# Patient Record
Sex: FEMALE | Race: WHITE | HISPANIC OR LATINO | URBAN - METROPOLITAN AREA
[De-identification: names, ages, dates, MRNs, and addresses within clinical notes are randomized per-mention and may not be internally consistent; named-entity substitution may affect disease eponyms.]

---

## 2023-01-23 ENCOUNTER — OFFICE VISIT (OUTPATIENT)
Dept: OBGYN CLINIC | Facility: CLINIC | Age: 38
End: 2023-01-23

## 2023-01-23 VITALS
WEIGHT: 207.2 LBS | HEART RATE: 75 BPM | BODY MASS INDEX: 35.37 KG/M2 | HEIGHT: 64 IN | SYSTOLIC BLOOD PRESSURE: 120 MMHG | DIASTOLIC BLOOD PRESSURE: 79 MMHG

## 2023-01-23 DIAGNOSIS — N92.0 MENORRHAGIA WITH REGULAR CYCLE: ICD-10-CM

## 2023-01-23 DIAGNOSIS — Z01.419 ENCOUNTER FOR ANNUAL ROUTINE GYNECOLOGICAL EXAMINATION: Primary | ICD-10-CM

## 2023-01-23 DIAGNOSIS — Z00.00 ENCOUNTER FOR MEDICAL EXAMINATION TO ESTABLISH CARE: ICD-10-CM

## 2023-01-23 NOTE — LETTER
2023    To Ene Ann  : 1985      This letter is to advise you that your recent PAP SMEAR results were reviewed by me and are NORMAL    We will see you in 1 year for your annual exam     Stacia Luna

## 2023-01-23 NOTE — PATIENT INSTRUCTIONS
Call with needs or concerns  Schedule pelvic ultrasound  Return in 1 year for annual GYN exam  Schedule family Practice appointment    COVID-19 Instructions    If you are having any of the following:  Cough   Shortness of breath   Fever  If traveled within past 2 weeks internationally or to high risk [place] states  Or been in contact with someone that has     Please call either:   Your PCP office  [phone], option [phone]    They will screen you over the phone and direct you to the nearest appropriate testing location    DO NOT go to your PCP or OB office without calling first       Samreen Allred

## 2023-01-23 NOTE — PROGRESS NOTES
Annual Exam    Assessment   1  Encounter for annual routine gynecological examination  Liquid-based pap, screening      2  Encounter for medical examination to establish care  Ambulatory Referral to [facility]      3  Menorrhagia with regular cycle  US pelvis complete non OB        well woman       Plan       All questions answered  Breast self exam technique reviewed and patient encouraged to perform self-exam monthly  Contraception: tubal ligation  Discussed healthy lifestyle modifications  Follow up in 1 year  Thin prep Pap smear  Patient Instructions   Call with needs or concerns  Schedule pelvic ultrasound  Return in 1 year for annual GYN exam  Schedule family Practice appointment  Pt verbalized understanding of all discussed  Subjective      Ene Orona Rater is a 40 y o  No obstetric history on file  female who presents for annual well woman exam  Periods are regular every 28-30 days, lasting 5 days  No intermenstrual bleeding, spotting, or discharge  Last PAP was in the [facility], 2 years ago, results are not available  Pt states she was told in the [facility] she has fibroids, no surgery was recommended, pt states she has heavy painful periods    Explained based on Hx of fibroids and heavy painful periods a pelvic U/S was ordered    Depression Screening Follow-up Plan: Patient's depression screening was negative with a PHQ-2 score of 0  Their PHQ-9 score was 0  Clinically patient does not have depression  No treatment is required  BMI Counseling: Body mass index is 35 57 kg/m²  The BMI is above normal  Nutrition recommendations include decreasing overall calorie intake, moderation in carbohydrate intake and increasing intake of lean protein  Exercise recommendations include moderate aerobic physical activity for 150 minutes/week          Current contraception: tubal ligation  History of abnormal Pap smear: no  Family history of uterine or ovarian cancer: no  Regular self breast exam: yes  History of abnormal mammogram: N/A  Family history of breast cancer: no  History of abnormal lipids: unknown  Menstrual History:  OB History        2    Para   2    Term   2            AB        Living   2       SAB        IAB        Ectopic        Multiple        Live Births   2                Menarche age: 16  Patient's last menstrual period was 2023 (exact date)  The following portions of the patient's history were reviewed and updated as appropriate: allergies, current medications, past family history, past medical history, past social history, past surgical history and problem list     Review of Systems  Pertinent items are noted in HPI        Objective      /79   Pulse 75   Ht 5' 4" (1 626 m)   Wt 94 kg (207 lb 3 2 oz)   LMP 2023 (Exact Date)   BMI 35 57 kg/m²     General: alert and oriented, in no acute distress, alert, appears stated age and cooperative   Heart: regular rate and rhythm, S1, S2 normal, no murmur, click, rub or gallop   Lungs: clear to auscultation bilaterally, WNL respiratory effort, negative cough or SOB   Thyroid: Negative masses   Abdomen: soft, non-tender, without masses or organomegaly   Vulva: normal   Vagina: normal mucosa   Cervix: no bleeding following Pap, no cervical motion tenderness and no lesions   Uterus: anteverted, non-tender, normal shape and consistency   Adnexa: normal adnexa   Urethra: normal   Breasts: NT,negative masses, discharge, or dimpling

## 2023-01-24 LAB
HPV HR 12 DNA CVX QL NAA+PROBE: NEGATIVE
HPV16 DNA CVX QL NAA+PROBE: NEGATIVE
HPV18 DNA CVX QL NAA+PROBE: NEGATIVE

## 2023-01-27 LAB
LAB AP GYN PRIMARY INTERPRETATION: NORMAL
Lab: NORMAL

## 2023-01-28 ENCOUNTER — HOSPITAL ENCOUNTER (OUTPATIENT)
Dept: ULTRASOUND IMAGING | Facility: HOSPITAL | Age: 38
Discharge: HOME | End: 2023-01-28

## 2023-01-28 DIAGNOSIS — N92.0 MENORRHAGIA WITH REGULAR CYCLE: ICD-10-CM

## 2023-02-07 ENCOUNTER — OFFICE VISIT (OUTPATIENT)
Dept: OBGYN CLINIC | Facility: CLINIC | Age: 38
End: 2023-02-07

## 2023-02-07 VITALS
WEIGHT: 206 LBS | BODY MASS INDEX: 35.17 KG/M2 | SYSTOLIC BLOOD PRESSURE: 101 MMHG | HEIGHT: 64 IN | DIASTOLIC BLOOD PRESSURE: 53 MMHG | HEART RATE: 59 BPM

## 2023-02-07 DIAGNOSIS — R10.2 PELVIC PAIN: Primary | ICD-10-CM

## 2023-02-07 NOTE — PROGRESS NOTES
Follow-Up U/S Visit     SUBJECTIVE:  CC: F/u U/S Results  HPI: Ene Vera is a 40 y o   female who presents to discuss U/S results and next steps  The patient has a long-standing history of pelvic pain, constantly present and worse with menses  She still has regular menses, occurring monthly and lasting about 5 days  She describes her pain as "pulling" and pressure  Her pain is very crampy and sharp with her menses  She has been told previously that she has had fibroids  She is s/p tubal sterilization and does not desire more children  She reports normal urinary function and generally normal bowel function with occasional constipation  Discussed the results of her TVUS performed 23, highlighting that (1) her uterus is retroflexed, which is a normal anatomic variant, (2) she has a small ovarian cyst that is normal in a menstruating, ovulating person, and (3) she has multiple fibroids, the largest of which is 8cm  "The uterus is retroflexed in position, measuring 14 2 x 6 8 x 8 8 cm  Diffuse heterogeneous echotexture of the markedly enlarged uterus containing multiple heterogeneous hypoechoic mass lesions noted likely representing leiomyomata  The largest mass measures 8 0 x 8 0 x 6 7 cm  The cervix appears within normal limits "    Treatment options for her pelvic pain were discussed, including expectant management, hormonal (both combination and progesterone-only), Mirena, Lysteda, endometrial ablation, and hysterectomy  The risks, benefits and side effects of each method were discussed, highlighting that hormonal treatment may shrink fibroids but they will not disappear and an ablation may be technically difficult with her fibroids  All questions have been answered to her satisfaction  The patient does not want to try hormonal therapy, as she does not want hormones and she feels they will just delay the correct therapy   She elects to move forward with hysterectomy  Discussed options for hysterectomy, including open, laparoscopic, robotic-assisted laparoscopic, and vaginal  Discussed approach and incisions as well as general lengths of recovery  She has a h/o two prior C/S  Likely plan for laparoscopic or robotically-assisted laparoscopic  Discussed with the patient that we josue schedule a surgical H&P to assess possibility of vaginal hysterectomy, where no incisions are made on the abdomen  She is aware that this is unlikely with her h/o CS x2  Discussed also the need for EMB to ensure benign pathology prior to plan for surgery  Discussed that, if abnormal cells are found, we may need to consult Gyn-Oncology and plan for a different surgery  Recommended she take 800mg of ibuprofen prior to her appointment  Discussed also that any surgery would spare her ovaries; that is, leave her ovaries in place  We plan to do this so that we do not put her into surgical menopause, and so we can protect her heart and bones moving forward  She is agreeable with this plan  History reviewed  No pertinent past medical history  Past Surgical History:   Procedure Laterality Date   •  SECTION      2   • TUBAL LIGATION         Social History     Tobacco Use   • Smoking status: Never   • Smokeless tobacco: Never   Substance Use Topics   • Alcohol use: Not Currently   • Drug use: Never       No current outpatient medications on file  OBJECTIVE:  Vitals:    23 0852   BP: 101/53   Pulse: 59   Weight: 93 4 kg (206 lb)   Height: 5' 4" (1 626 m)           ASSESSMENT/PLAN:  Problem List    None  Visit Diagnoses     Pelvic pain    -  Primary          Ene Sierra is a 40 y o   female who presents to discuss her TVUS and discuss next steps  Plan for Surgical H&P, EMB  Need to sign surgical H&P and MA-30  RTC in 2-3 for appointment        Elma Mclean MD   PGY-3, OBGYN  23 9:33 AM

## 2023-02-15 ENCOUNTER — OFFICE VISIT (OUTPATIENT)
Dept: FAMILY MEDICINE CLINIC | Facility: CLINIC | Age: 38
End: 2023-02-15

## 2023-02-15 VITALS
TEMPERATURE: 97.7 F | SYSTOLIC BLOOD PRESSURE: 110 MMHG | HEART RATE: 90 BPM | OXYGEN SATURATION: 98 % | HEIGHT: 64 IN | RESPIRATION RATE: 16 BRPM | DIASTOLIC BLOOD PRESSURE: 72 MMHG | BODY MASS INDEX: 35.85 KG/M2 | WEIGHT: 210 LBS

## 2023-02-15 DIAGNOSIS — R20.2 PARESTHESIA: ICD-10-CM

## 2023-02-15 DIAGNOSIS — R09.81 NASAL CONGESTION: ICD-10-CM

## 2023-02-15 DIAGNOSIS — Z11.4 SCREENING FOR HIV (HUMAN IMMUNODEFICIENCY VIRUS): ICD-10-CM

## 2023-02-15 DIAGNOSIS — M25.50 POLYARTHRALGIA: ICD-10-CM

## 2023-02-15 DIAGNOSIS — R20.9 COLD HANDS: ICD-10-CM

## 2023-02-15 DIAGNOSIS — E66.9 OBESITY (BMI 30-39.9): ICD-10-CM

## 2023-02-15 DIAGNOSIS — R42 DIZZINESS: Primary | ICD-10-CM

## 2023-02-15 DIAGNOSIS — Z11.59 ENCOUNTER FOR HEPATITIS C SCREENING TEST FOR LOW RISK PATIENT: ICD-10-CM

## 2023-02-15 DIAGNOSIS — N92.6 IRREGULAR MENSES: ICD-10-CM

## 2023-02-15 NOTE — PATIENT INSTRUCTIONS
Mareos   CUIDADO AMBULATORIO:   Mareos es la sensación de falta de equilibrio o inestabilidad  Las causas comunes del mareo son un desequilibrio del líquido del oído interno o la falta de oxígeno en guerin joni  Los [employer] ser [employer] (durar 3 días o menos) o crónicos (durar más de 3 días)  Usted puede llegar a tener episodios de [employer] que deleon de segundos a unas horas  Síntomas comunes que pueden presentarse con el mareo:  Salvador sensación de que guerin entorno se está moviendo aun cuando usted está quieto    [employer] oídos o pérdida del oído    Sensación de mareo o desvanecimiento    Debilidad o inestabilidad    Visión doble o movimientos oculares que usted no puede controlar    Náuseas o vómitos    Confusión    Busque atención médica de inmediato si:  Usted tiene dolor de [place] y rigidez en el khadijah  Usted tiene escalofríos con temblores y fiebre  Usted vomita salvador y [place] vez sin alivio  Guerin vómito o deposiciones están barahona o negras  Usted tiene [place] [employer], espalda o abdomen  Usted tiene adormecimiento, sobre todo en la avelina, brazos o piernas  Usted tiene dificultad para  los brazos o las piernas  Usted está confundido  Comuníquese con guerin médico si:  Tiene fiebre  Ramu síntomas no mejoran con el tratamiento  Usted tiene preguntas o inquietudes acerca de guerin condición o cuidado  El tratamiento para los mareos depende de la causa  Guerin médico podría darle oxígeno o medicamentos para disminuir los [employer] y la náusea  Además podría referirlo a un especialista  También es probable que usted necesite que lo internen en el hospital para recibir tratamiento  El manejo de ramu síntomas:  No maneje u opere maquinaria pesada cuando esté mareado  Lev[place]nt[place] lentamente cuando esté sentado o acostado  [place] suficiente líquidos  Los líquidos ayudan a evitar la deshidratación   Pregunte cuánto líquido debe christin cada día y cuáles líquidos son los más adecuados para zehra Love a la consulta de control con hinson médico según las indicaciones: Anote ramu preguntas para que se acuerde de hacerlas corwin ramu visitas  © Copyright [employer] 2022 Information is for End User's use only and may not be sold, redistributed or otherwise used for commercial purposes  All illustrations and images included in CareNotes® are the copyrighted property of A D A M , [facility]  or [address] [place] [address] es sólo para uso en educación  Hinson intención no es darle un consejo médico sobre enfermedades o tratamientos  Colsulte con hinson [employer] farmacéutico antes de seguir cualquier régimen médico para saber si es seguro y efectivo para zehra

## 2023-02-15 NOTE — PROGRESS NOTES
Name: Eunice Umana      : 1985      MRN: [id number]  Encounter Provider: ANTONIO Raymundo  Encounter Date: 2/15/2023   Encounter department: [facility]    Assessment & Plan     1  Dizziness  Assessment & Plan:  She reports daily episodes of feeling as though she will pass out that last a few seconds , occurs generally with position changes   EKG in office showed sinus arrhyhtmia, no st-t inversion/ abnormality   Discussed sx may be 2/2 hypovolemia- will check labs   If any change in sx she is to inform me or present to ED for worsening sx     Orders:  -     CBC and differential; Future  -     Comprehensive metabolic panel; Future  -     Hemoglobin A1C; Future  -     TSH, 3rd generation with Free T4 reflex; Future  -     Iron Panel (Includes Ferritin, Iron Sat%, Iron, and TIBC); Future    2  Polyarthralgia  Assessment & Plan:  Patient reports chronic pain to bilateral shoulders, arms, wrists- no abnormality on exam  Will check labs     Orders:  -     Vitamin D 25 hydroxy; Future    3  Cold hands  -     Vitamin B12; Future    4  Irregular menses  Assessment & Plan:  Patient reports that menses is very painful and irregular - she had pelvic U/S 2023 that showed:  IMPRESSION:  Markedly enlarged heterogeneous uterus containing multiple hypoechoic mass lesions with the largest measuring up to 8 0 cm likely representing leiomyomata  2 1 cm right ovarian cyst/follicle  No suspicious solid adnexal mass or significant free pelvic   fluid noted  A small amount of endometrial fluid noted in the lower uterine segment  Will also check labs   Recommend she continue to follow with gynecology     Orders:  -     CBC and differential; Future  -     Comprehensive metabolic panel; Future  -     Hemoglobin A1C; Future  -     TSH, 3rd generation with Free T4 reflex; Future  -     Iron Panel (Includes Ferritin, Iron Sat%, Iron, and TIBC); Future    5   Obesity (BMI 30-39  9)  Assessment & Plan:  -Encouraged diet and lifestyle changes: decrease processed foods (cakes, cookies, chips, soda), decrease total carbohydrate intake, decrease fried/fatty foods, increase fruits and vegetables, increase lean proteins (chicken, turkey), increase healthy fats (avocado, fish, nuts), drink plenty of water (at least four 16 oz bottles per day)  -encouraged increased activity     Orders:  -     Comprehensive metabolic panel; Future  -     Hemoglobin A1C; Future  -     Lipid panel; Future  -     TSH, 3rd generation with Free T4 reflex; Future    6  Encounter for hepatitis C screening test for low risk patient  -     Hepatitis C antibody; Future    7  Screening for HIV (human immunodeficiency virus)  -     : HIV 1/2 AB/AG w Reflex [facility] for 2 yr old and above; Future    8  Paresthesia  Assessment & Plan:  Reports feeling intermittent "cold" in her hands and numbness and tingling   No abnormalities on exam- +pulse, cap refill,  Sensation   Will check labs     Orders:  -     Vitamin B12; Future    9  Nasal congestion  -     azelastine (ASTELIN) 0 1 % nasal spray; 1 spray into each nostril 2 (two) times a day Use in each nostril as directed         Subjective     41 YO female with no significant past medical hx presents today to establish care  She reports that she is experiencing daily episodes of dizziness for the past several months, worse when she wakes up in the middle of the night when she gets up and lasts a few seconds then resolves  Reports that episodes occur with headache  Denies any cardiac hx or family cardiac hx  No chest pain, shortness of breath, or n/v  She at times experiences a cold feeling of her hands, does not occur at same time as dizziness  She also has pain to her shoulder, wrists, and hands  She does have hx of fibroids and irregular periods, sometimes very heavy bleeding  Review of Systems   Constitutional: Negative for chills and fever     HENT: Negative for ear pain and sore throat  Eyes: Negative for pain and visual disturbance  Respiratory: Negative for cough and shortness of breath  Cardiovascular: Negative for chest pain and palpitations  Gastrointestinal: Negative for abdominal pain and vomiting  Genitourinary: Negative for dysuria and hematuria  Musculoskeletal: Positive for arthralgias and myalgias  Negative for back pain  Skin: Negative for color change and rash  Neurological: Positive for dizziness, numbness and headaches  Negative for seizures and syncope  All other systems reviewed and are negative  No past medical history on file  Past Surgical History:   Procedure Laterality Date   •  SECTION      2   • TUBAL LIGATION       Family History   Problem Relation Age of Onset   • Hypertension Mother    • Diabetes Brother      Social History     Socioeconomic History   • Marital status: /Civil Union     Spouse name: None   • Number of children: None   • Years of education: None   • Highest education level: None   Occupational History   • None   Tobacco Use   • Smoking status: Never   • Smokeless tobacco: Never   Substance and Sexual Activity   • Alcohol use: Not Currently   • Drug use: Never   • Sexual activity: Yes   Other Topics Concern   • None   Social History Narrative   • None     Social Determinants of Health     Financial Resource Strain: Low Risk    • Difficulty of Paying Living Expenses: Not hard at all   Food Insecurity: No Food Insecurity   • Worried About Running Out of Food in the Last Year: Never true   • Ran Out of Food in the Last Year: Never true   Transportation Needs: No Transportation Needs   • Lack of Transportation (Medical): No   • Lack of Transportation (Non-Medical):  No   Physical Activity: Not on file   Stress: Not on file   Social Connections: Not on file   Intimate Partner Violence: Not on file   Housing Stability: Low Risk    • Unable to Pay for Housing in the Last Year: No   • Number of Places Lived in the Last Year: 1   • Unstable Housing in the Last Year: No     No current outpatient medications on file prior to visit  No Known Allergies    There is no immunization history on file for this patient  Objective     /72 (BP Location: Left arm, Patient Position: Sitting, Cuff Size: Adult)   Pulse 90   Temp 97 7 °F (36 5 °C) (Temporal)   Resp 16   Ht 5' 4" (1 626 m)   Wt 95 3 kg (210 lb)   LMP 01/17/2023 (Exact Date)   SpO2 98%   BMI 36 05 kg/m²     Physical Exam  Vitals and nursing note reviewed  Constitutional:       General: She is not in acute distress  Appearance: She is well-developed  She is not diaphoretic  HENT:      Head: Normocephalic and atraumatic  Right Ear: External ear normal       Left Ear: External ear normal       Nose:      Right Turbinates: Enlarged and swollen  Left Turbinates: Enlarged and swollen  Eyes:      General:         Right eye: No discharge  Left eye: No discharge  Conjunctiva/sclera: Conjunctivae normal       Pupils: Pupils are equal, round, and reactive to light  Cardiovascular:      Rate and Rhythm: Normal rate and regular rhythm  Pulmonary:      Effort: Pulmonary effort is normal  No respiratory distress  Breath sounds: Normal breath sounds  No wheezing  Abdominal:      General: Bowel sounds are normal  There is no distension  Palpations: Abdomen is soft  Tenderness: There is no abdominal tenderness  There is no guarding or rebound  Musculoskeletal:         General: No deformity  Normal range of motion  Cervical back: Normal range of motion and neck supple  Lymphadenopathy:      Cervical: No cervical adenopathy  Skin:     General: Skin is warm and dry  Capillary Refill: Capillary refill takes less than 2 seconds  Findings: No rash  Neurological:      General: No focal deficit present  Mental Status: She is alert and oriented to person, place, and time  Cranial Nerves:  No cranial nerve deficit  Sensory: No sensory deficit  Motor: No weakness        Coordination: Coordination normal       Gait: Gait normal       Deep Tendon Reflexes: Reflexes normal    Psychiatric:         Mood and Affect: Mood normal          Behavior: Behavior normal        Rosey ANTONIO Manriquez

## 2023-02-16 ENCOUNTER — APPOINTMENT (OUTPATIENT)
Dept: LAB | Facility: CLINIC | Age: 38
End: 2023-02-16

## 2023-02-16 DIAGNOSIS — R42 DIZZINESS: ICD-10-CM

## 2023-02-16 DIAGNOSIS — N92.6 IRREGULAR MENSES: ICD-10-CM

## 2023-02-16 DIAGNOSIS — Z11.59 ENCOUNTER FOR HEPATITIS C SCREENING TEST FOR LOW RISK PATIENT: ICD-10-CM

## 2023-02-16 DIAGNOSIS — R20.9 COLD HANDS: ICD-10-CM

## 2023-02-16 DIAGNOSIS — M25.50 POLYARTHRALGIA: ICD-10-CM

## 2023-02-16 DIAGNOSIS — Z11.4 SCREENING FOR HIV (HUMAN IMMUNODEFICIENCY VIRUS): ICD-10-CM

## 2023-02-16 DIAGNOSIS — E66.9 OBESITY (BMI 30-39.9): ICD-10-CM

## 2023-02-16 DIAGNOSIS — R20.2 PARESTHESIA: ICD-10-CM

## 2023-02-16 LAB
25(OH)D3 SERPL-MCNC: 11.7 NG/ML (ref 30–100)
ALBUMIN SERPL BCP-MCNC: 4.1 G/DL (ref 3.5–5)
ALP SERPL-CCNC: 65 U/L (ref 46–116)
ALT SERPL W P-5'-P-CCNC: 25 U/L (ref 12–78)
ANION GAP SERPL CALCULATED.3IONS-SCNC: 6 MMOL/L (ref 4–13)
AST SERPL W P-5'-P-CCNC: 20 U/L (ref 5–45)
BASOPHILS # BLD AUTO: 0.06 THOUSANDS/ÂΜL (ref 0–0.1)
BASOPHILS NFR BLD AUTO: 1 % (ref 0–1)
BILIRUB SERPL-MCNC: 0.38 MG/DL (ref 0.2–1)
BUN SERPL-MCNC: 13 MG/DL (ref 5–25)
CALCIUM SERPL-MCNC: 9.5 MG/DL (ref 8.3–10.1)
CHLORIDE SERPL-SCNC: 107 MMOL/L (ref 96–108)
CHOLEST SERPL-MCNC: 172 MG/DL
CO2 SERPL-SCNC: 24 MMOL/L (ref 21–32)
CREAT SERPL-MCNC: 0.72 MG/DL (ref 0.6–1.3)
EOSINOPHIL # BLD AUTO: 0.07 THOUSAND/ÂΜL (ref 0–0.61)
EOSINOPHIL NFR BLD AUTO: 1 % (ref 0–6)
ERYTHROCYTE [DISTWIDTH] IN BLOOD BY AUTOMATED COUNT: 12.3 % (ref 11.6–15.1)
EST. AVERAGE GLUCOSE BLD GHB EST-MCNC: 108 MG/DL
FERRITIN SERPL-MCNC: 8 NG/ML (ref 8–388)
GFR SERPL CREATININE-BSD FRML MDRD: 108 ML/MIN/1.73SQ M
GLUCOSE P FAST SERPL-MCNC: 80 MG/DL (ref 65–99)
HBA1C MFR BLD: 5.4 %
HCT VFR BLD AUTO: 42 % (ref 34.8–46.1)
HCV AB SER QL: NORMAL
HDLC SERPL-MCNC: 54 MG/DL
HGB BLD-MCNC: 13.5 G/DL (ref 11.5–15.4)
IMM GRANULOCYTES # BLD AUTO: 0.01 THOUSAND/UL (ref 0–0.2)
IMM GRANULOCYTES NFR BLD AUTO: 0 % (ref 0–2)
IRON SATN MFR SERPL: 21 % (ref 15–50)
IRON SERPL-MCNC: 71 UG/DL (ref 50–170)
LDLC SERPL CALC-MCNC: 92 MG/DL (ref 0–100)
LYMPHOCYTES # BLD AUTO: 2.05 THOUSANDS/ÂΜL (ref 0.6–4.47)
LYMPHOCYTES NFR BLD AUTO: 34 % (ref 14–44)
MCH RBC QN AUTO: 30.4 PG (ref 26.8–34.3)
MCHC RBC AUTO-ENTMCNC: 32.1 G/DL (ref 31.4–37.4)
MCV RBC AUTO: 95 FL (ref 82–98)
MONOCYTES # BLD AUTO: 0.4 THOUSAND/ÂΜL (ref 0.17–1.22)
MONOCYTES NFR BLD AUTO: 7 % (ref 4–12)
NEUTROPHILS # BLD AUTO: 3.41 THOUSANDS/ÂΜL (ref 1.85–7.62)
NEUTS SEG NFR BLD AUTO: 57 % (ref 43–75)
NONHDLC SERPL-MCNC: 118 MG/DL
NRBC BLD AUTO-RTO: 0 /100 WBCS
PLATELET # BLD AUTO: 305 THOUSANDS/UL (ref 149–390)
PMV BLD AUTO: 11.2 FL (ref 8.9–12.7)
POTASSIUM SERPL-SCNC: 4.2 MMOL/L (ref 3.5–5.3)
PROT SERPL-MCNC: 7.4 G/DL (ref 6.4–8.4)
RBC # BLD AUTO: 4.44 MILLION/UL (ref 3.81–5.12)
SODIUM SERPL-SCNC: 137 MMOL/L (ref 135–147)
TIBC SERPL-MCNC: 339 UG/DL (ref 250–450)
TRIGL SERPL-MCNC: 129 MG/DL
TSH SERPL DL<=0.05 MIU/L-ACNC: 1.81 UIU/ML (ref 0.45–4.5)
VIT B12 SERPL-MCNC: 313 PG/ML (ref 100–900)
WBC # BLD AUTO: 6 THOUSAND/UL (ref 4.31–10.16)

## 2023-02-16 RX ORDER — AZELASTINE 1 MG/ML
1 SPRAY, METERED NASAL 2 TIMES DAILY
Qty: 30 ML | Refills: 3 | Status: SHIPPED | OUTPATIENT
Start: 2023-02-16

## 2023-02-16 NOTE — ASSESSMENT & PLAN NOTE
Patient reports chronic pain to bilateral shoulders, arms, wrists- no abnormality on exam  Will check labs

## 2023-02-16 NOTE — ASSESSMENT & PLAN NOTE
Patient reports that menses is very painful and irregular - she had pelvic U/S 1/2023 that showed:  IMPRESSION:  Markedly enlarged heterogeneous uterus containing multiple hypoechoic mass lesions with the largest measuring up to 8 0 cm likely representing leiomyomata  2 1 cm right ovarian cyst/follicle  No suspicious solid adnexal mass or significant free pelvic   fluid noted  A small amount of endometrial fluid noted in the lower uterine segment      Will also check labs   Recommend she continue to follow with gynecology

## 2023-02-16 NOTE — ASSESSMENT & PLAN NOTE
She reports daily episodes of feeling as though she will pass out that last a few seconds , occurs generally with position changes   EKG in office showed sinus arrhyhtmia, no st-t inversion/ abnormality   Discussed sx may be 2/2 hypovolemia- will check labs   If any change in sx she is to inform me or present to ED for worsening sx

## 2023-02-16 NOTE — ASSESSMENT & PLAN NOTE
-Encouraged diet and lifestyle changes: decrease processed foods (cakes, cookies, chips, soda), decrease total carbohydrate intake, decrease fried/fatty foods, increase fruits and vegetables, increase lean proteins (chicken, turkey), increase healthy fats (avocado, fish, nuts), drink plenty of water (at least four 16 oz bottles per day)  -encouraged increased activity

## 2023-02-16 NOTE — ASSESSMENT & PLAN NOTE
Reports feeling intermittent "cold" in her hands and numbness and tingling   No abnormalities on exam- +pulse, cap refill,  Sensation   Will check labs

## 2023-02-17 LAB
HIV 1+2 AB+HIV1 P24 AG SERPL QL IA: NORMAL
HIV 2 AB SERPL QL IA: NORMAL
HIV1 AB SERPL QL IA: NORMAL
HIV1 P24 AG SERPL QL IA: NORMAL

## 2023-02-18 DIAGNOSIS — E55.9 VITAMIN D DEFICIENCY: Primary | ICD-10-CM

## 2023-02-18 RX ORDER — ERGOCALCIFEROL 1.25 MG/1
50000 CAPSULE ORAL WEEKLY
Qty: 16 CAPSULE | Refills: 0 | Status: SHIPPED | OUTPATIENT
Start: 2023-02-18 | End: 2023-05-12

## 2023-03-02 ENCOUNTER — OFFICE VISIT (OUTPATIENT)
Dept: OBGYN CLINIC | Facility: CLINIC | Age: 38
End: 2023-03-02

## 2023-03-02 VITALS
WEIGHT: 210 LBS | BODY MASS INDEX: 36.05 KG/M2 | SYSTOLIC BLOOD PRESSURE: 121 MMHG | DIASTOLIC BLOOD PRESSURE: 80 MMHG | HEART RATE: 120 BPM

## 2023-03-02 DIAGNOSIS — N92.6 IRREGULAR MENSES: Primary | ICD-10-CM

## 2023-03-02 NOTE — PROGRESS NOTES
Endometrial biopsy    Date/Time: 3/2/2023 3:25 PM  Performed by: Livier Kruse MD  Authorized by: Livier Kruse MD   Universal Protocol:  Consent: Verbal consent obtained  Written consent obtained  Risks and benefits: risks, benefits and alternatives were discussed  Consent given by: patient  Patient understanding: patient states understanding of the procedure being performed  Patient consent: the patient's understanding of the procedure matches consent given  Procedure consent: procedure consent matches procedure scheduled  Relevant documents: relevant documents present and verified  Test results: test results available and properly labeled  Required items: required blood products, implants, devices, and special equipment available  Patient identity confirmed: verbally with patient      Indication:     Indications: Other disorder of menstruation and other abnormal bleeding from female genital tract    Pre-procedure:     Premeds:  Ibuprofen  Procedure:     Procedure: endometrial biopsy with Pipelle      A bivalve speculum was placed in the vagina: yes      Cervix cleaned and prepped: yes      The cervix was dilated: yes      Specimen collected: insufficient sample size      Patient tolerated procedure well with no complications: yes      Unable to perform due to: cervical stenosis    Findings:     Cervix: stenotic    Comments:     Procedure comments:  Cervix very anterior in vaginal vault, nulliparous, and stenotic, minimal descensus  Cervical os dilated with os finder but unable to pass the internal os despite multiple attempts  Ene Rivera presents for endometrial biopsy in the setting of abnormal bleeding  She is hoping to have definitive management of her abnormal bleeding with hysterectomy, but is unable to take much time off right now for any procedures  Her LMP was 2/10/23  Discussed with patient findings of cervical stenosis and inability to obtain appropriate sample   Discussed options for repeat attempts at sampling including attempting again in the office after her menses or with premedication with cytotec or attempting in the OR with D&C and hysteroscopy  She declines further in office attempts due to her significant anxiety and the discomfort she experienced today  She is not willing to attempt further cervical dilation in the office  Discussed risks, benefits, and alternatives of hysteroscopy, D&C  Discussed risks of pain, infection, bleeding, and injury to nearby organs  Discussed risks of uterine perforation or false passage, as well as risk of injury to bladder, bowel, vasculature  Discussed potential for laparoscopy or laparotomy in case of injury, as well as potential for emergent hysterectomy  Discussed risks of anesthesia  Discussed operative expectations, that would be same day procedure and recovery should be over several days  Patient is amenable and desires to proceed  Consent signed today for exam under anesthesia, hysteroscopy, dilation and curettage, and all other indicated procedures  Discussed that will call her to return for preoperative H&P      Discussed with Dr Trevor Hankins

## 2023-04-05 ENCOUNTER — DOCUMENTATION (OUTPATIENT)
Dept: OBGYN CLINIC | Facility: CLINIC | Age: 38
End: 2023-04-05

## 2023-05-11 ENCOUNTER — OFFICE VISIT (OUTPATIENT)
Dept: OBGYN CLINIC | Facility: CLINIC | Age: 38
End: 2023-05-11

## 2023-05-11 VITALS
HEART RATE: 71 BPM | BODY MASS INDEX: 36.15 KG/M2 | DIASTOLIC BLOOD PRESSURE: 71 MMHG | SYSTOLIC BLOOD PRESSURE: 107 MMHG | WEIGHT: 210.6 LBS

## 2023-05-11 DIAGNOSIS — N92.6 IRREGULAR MENSES: ICD-10-CM

## 2023-05-11 DIAGNOSIS — Z01.818 PRE-OP EXAMINATION: Primary | ICD-10-CM

## 2023-05-11 NOTE — PROGRESS NOTES
[address] [facility] H&P VISIT    SUBJECTIVE:  Daniel Jordanian interpretor: Jack Kincaid [id number]  HPI: Ene Wallace is a 40 y o   female who presents for surgical H&P exam  She is scheduled for exam under anesthesia, hysteroscopy, dilation and curettage, and all other indicated procedures on 23  She denies any other changes since her last office visit  Patient has a long history of pelvic pain and painful regular menses in the setting of known enlarged, fibroid uterus  She was initially evaluated on 23, and offered management options, including expectant, hormonal, and surgical  She is opting for hysterectomy  Pre-op EMB was attempted in the office on 3/2/23 but was unsuccessful  Patient opted for sampling in the OR rather than re-sampling in the office  No past medical history on file  OB History    Para Term  AB Living   2 2 2     2   SAB IAB Ectopic Multiple Live Births           2      # Outcome Date GA Lbr Gonzalo/2nd Weight Sex Delivery Anes PTL Lv   2 Term            1 Term                Past Surgical History:   Procedure Laterality Date   •  SECTION      2   • TUBAL LIGATION         Social History     Tobacco Use   • Smoking status: Never   • Smokeless tobacco: Never   Substance Use Topics   • Alcohol use: Not Currently   • Drug use: Never         Current Outpatient Medications:   •  azelastine (ASTELIN) 0 1 % nasal spray, 1 spray into each nostril 2 (two) times a day Use in each nostril as directed, Disp: 30 mL, Rfl: 3  •  ergocalciferol (VITAMIN D2) 50,000 units, Take 1 capsule (50,000 Units total) by mouth once a week, Disp: 16 capsule, Rfl: 0    OBJECTIVE:  Vitals:    23 1325   BP: 107/71   Pulse: 71   Weight: 95 5 kg (210 lb 9 6 oz)       Physical Exam  Vitals reviewed  Constitutional:       General: She is not in acute distress  Appearance: She is well-developed     HENT:      Head: Normocephalic and atraumatic  Eyes:      Conjunctiva/sclera: Conjunctivae normal    Cardiovascular:      Rate and Rhythm: Normal rate  Pulmonary:      Effort: Pulmonary effort is normal    Abdominal:      General: There is no distension  Palpations: Abdomen is soft  Tenderness: There is no abdominal tenderness  Musculoskeletal:         General: Normal range of motion  Skin:     General: Skin is warm and dry  Neurological:      General: No focal deficit present  Mental Status: She is alert and oriented to person, place, and time  Mental status is at baseline  Psychiatric:         Mood and Affect: Mood normal          Behavior: Behavior normal          Thought Content: Thought content normal         US pelvis complete w transvaginal 1/28/23  FINDINGS:  UTERUS:  The uterus is retroflexed in position, measuring 14 2 x 6 8 x 8 8 cm  Diffuse heterogeneous echotexture of the markedly enlarged uterus containing multiple heterogeneous hypoechoic mass lesions noted likely representing leiomyomata  The largest mass measures 8 0 x 8 0 x 6 7 cm  The cervix appears within normal limits      ENDOMETRIUM:    The endometrial echo complex has an AP caliber of 9 0 mm  There is fluid in the lower uterine segment noted  Tiny subendometrial cyst in the lower uterine segment  No endometrial polyp or mass seen      OVARIES/ADNEXA:  Right ovary:  3 6 x 3 1 x 1 6 cm  9 4 mL containing a 2 1 x 1 4 x 1 5 cm simple cyst/follicle  Left ovary:  3 3 x 3 1 x 1 2 cm  6 4 mL  Ovarian Doppler flow is within normal limits  No suspicious ovarian or adnexal abnormality      OTHER:  No free fluid or loculated fluid collections      IMPRESSION:  Markedly enlarged heterogeneous uterus containing multiple hypoechoic mass lesions with the largest measuring up to 8 0 cm likely representing leiomyomata  2 1 cm right ovarian cyst/follicle  No suspicious solid adnexal mass or significant free pelvic   fluid noted    A small amount of endometrial fluid noted in the lower uterine segment  Lab Results   Component Value Date    WBC 6 00 2023    HGB 13 5 2023    HCT 42 0 2023    MCV 95 2023     2023       ASSESSMENT:  Ene Wallace is a 40 y o   female who presents for surgical H&P exam  She is scheduled for exam under anesthesia, hysteroscopy, dilation and curettage, and all other indicated procedures on 23  Discussed techniques for hysteroscopy, D&C  Discussed pelvic exam under anesthesia  Discussed risks, including bleeding, need for blood transfusion, infection, damage to surrounding structures, uterine perforation, need for laparoscopy/laparotomy, risks of anesthesia, DVT, and, rarely, death  Benefits include obtaining adequate endometrial sampling in preparation for future hysterectomy  Briefly discussed post-op care, including pelvic rest  Discussed precautions for what to call / re-present for  Patient verbalized understanding and had all of her questions answered  Surgical consent previously signed 3/2/23  Patient accepts all blood products if medically indicated  Patient accepts all life saving measures if medically indicated  No results found for this or any previous visit (from the past 12 hour(s))  PLAN:  - Pre-op surgical wash given   - NPO after midnight day of surgery   - RTC for 2 week post op visit      Rebekah Leonard MD   PGY-4, OBGYN  23

## 2023-05-15 ENCOUNTER — TELEPHONE (OUTPATIENT)
Dept: OBGYN CLINIC | Facility: MEDICAL CENTER | Age: 38
End: 2023-05-15

## 2023-05-23 ENCOUNTER — TELEPHONE (OUTPATIENT)
Dept: OBGYN CLINIC | Facility: MEDICAL CENTER | Age: 38
End: 2023-05-23

## 2023-05-23 ENCOUNTER — TELEPHONE (OUTPATIENT)
Dept: OBGYN CLINIC | Facility: CLINIC | Age: 38
End: 2023-05-23

## 2023-05-23 NOTE — TELEPHONE ENCOUNTER
Patient called about her surgery tomorrow and would like to cancel it  Patient is not feeling well and would like to reschedule

## 2023-05-23 NOTE — TELEPHONE ENCOUNTER
Called pt she states that she is not canceling surgery   I gave her the address and she wishes to move forward with the surgery

## 2024-08-15 ENCOUNTER — OFFICE VISIT (OUTPATIENT)
Dept: OBGYN CLINIC | Facility: CLINIC | Age: 39
End: 2024-08-15

## 2024-08-15 VITALS
DIASTOLIC BLOOD PRESSURE: 76 MMHG | WEIGHT: 217.6 LBS | BODY MASS INDEX: 37.15 KG/M2 | HEART RATE: 274 BPM | HEIGHT: 64 IN | SYSTOLIC BLOOD PRESSURE: 112 MMHG

## 2024-08-15 DIAGNOSIS — R10.2 PELVIC PAIN: Primary | ICD-10-CM

## 2024-08-15 LAB
CLUE CELLS SPEC QL WET PREP: NEGATIVE
SL AMB POCT WET MOUNT: NEGATIVE
T VAGINALIS VAG QL WET PREP: NEGATIVE
YEAST VAG QL WET PREP: NEGATIVE

## 2024-08-15 PROCEDURE — 87210 SMEAR WET MOUNT SALINE/INK: CPT | Performed by: OBSTETRICS & GYNECOLOGY

## 2024-08-15 PROCEDURE — 87591 N.GONORRHOEAE DNA AMP PROB: CPT

## 2024-08-15 PROCEDURE — 87491 CHLMYD TRACH DNA AMP PROBE: CPT

## 2024-08-15 PROCEDURE — 99213 OFFICE O/P EST LOW 20 MIN: CPT | Performed by: OBSTETRICS & GYNECOLOGY

## 2024-08-15 RX ORDER — ESCITALOPRAM OXALATE 20 MG/1
20 TABLET ORAL DAILY
COMMUNITY
Start: 2024-07-09

## 2024-08-15 NOTE — PROGRESS NOTES
"OB/GYN VISIT  Ene Samuels  8/15/2024  1:07 PM    Subjective:     Ene Samuels is a 39 y.o.  female who presents for discussion of pelvic pain.     Ene was previously seen in office in 2023 for irregular menses and was diagnosed with a fibroid uterus. She has since undergone a TINY and unilateral oophorectomy in the [place] last year, she is unsure which side her ovary was taken on.     Ene reports that her pain is present in the morning. She states immediately upon waking, she urinates which does not improve the pain. She states the pain initially began on her left side but now is primarily below the umbilicus. She feels as though the pain is \"deep\" inside. She endorses daily, soft bowel movements. She endorses pain with intercourse that is improved with lying on her side and a sensation of \"burning\" in her vagina.     Menstrual History:  OB History          2    Para   2    Term   2            AB        Living   2         SAB        IAB        Ectopic        Multiple        Live Births   2                Patient's last menstrual period was 2023 (approximate).       No past medical history on file.  Past Surgical History:   Procedure Laterality Date     SECTION      2    TUBAL LIGATION         Objective:    Vitals: Blood pressure 112/76, pulse (!) 274, height 5' 4\" (1.626 m), weight 98.7 kg (217 lb 9.6 oz), last menstrual period 2023.Body mass index is 37.35 kg/m².    Physical Exam  Exam conducted with a chaperone present.   Constitutional:       General: She is not in acute distress.     Appearance: Normal appearance.   HENT:      Head: Normocephalic and atraumatic.   Cardiovascular:      Rate and Rhythm: Normal rate.   Pulmonary:      Effort: Pulmonary effort is normal.   Abdominal:      Tenderness: There is abdominal tenderness in the periumbilical area. There is no guarding or rebound.      Comments: Well healed " Cherney incision from open hysterectomy    Genitourinary:     Labia:         Right: No rash, tenderness or lesion.         Left: No rash, tenderness or lesion.       Vagina: No vaginal discharge or tenderness.      Uterus: Absent.       Adnexa:         Right: No tenderness.          Left: No tenderness.        Comments: Surgically absent uterus  Vaginal cuff intact with no defect, bleeding or abnormal discharge   Skin:     General: Skin is warm and dry.   Neurological:      General: No focal deficit present.      Mental Status: She is alert.         Depression Screening Follow-up Plan: Patient's depression screening was negative with a PHQ-2 score of 0. Their PHQ-9 score was 0. Clinically patient does not have depression. No treatment is required.      Assessment/Plan:  Problem List Items Addressed This Visit       Pelvic pain - Primary     Assessment:   Endorses pelvic pain which started on the right side, now in midline   S/p TINY, unilateral oophorectomy in [place] last year - is uncertain which ovary was taken   Pelvic exam with no palpable masses and intact cuff     Plan:   GC/CT collected   UA negative for signs of infection   Wet mount negative for yeast/BV/Trich   Plan for pelvic US to evaluate adnexa          Relevant Orders    US pelvis complete non OB    POCT wet mount (Completed)    Chlamydia/GC amplified DNA by PCR       D/w Dr. Candie Rojas MD  8/15/2024  1:07 PM

## 2024-08-15 NOTE — ASSESSMENT & PLAN NOTE
Assessment:   Endorses pelvic pain which started on the right side, now in midline   S/p TINY, unilateral oophorectomy in [place] last year - is uncertain which ovary was taken   Pelvic exam with no palpable masses and intact cuff     Plan:   GC/CT collected   UA negative for signs of infection   Wet mount negative for yeast/BV/Trich   Plan for pelvic US to evaluate adnexa

## 2024-08-16 LAB
C TRACH DNA SPEC QL NAA+PROBE: NEGATIVE
N GONORRHOEA DNA SPEC QL NAA+PROBE: NEGATIVE

## 2024-08-24 ENCOUNTER — HOSPITAL ENCOUNTER (OUTPATIENT)
Dept: ULTRASOUND IMAGING | Facility: HOSPITAL | Age: 39
Discharge: HOME | End: 2024-08-24
Payer: COMMERCIAL

## 2024-08-24 DIAGNOSIS — R10.2 PELVIC PAIN: ICD-10-CM

## 2024-08-24 PROCEDURE — 76830 TRANSVAGINAL US NON-OB: CPT

## 2024-08-24 PROCEDURE — 76856 US EXAM PELVIC COMPLETE: CPT

## 2024-08-26 ENCOUNTER — TELEPHONE (OUTPATIENT)
Dept: OBGYN CLINIC | Facility: CLINIC | Age: 39
End: 2024-08-26

## 2024-08-26 NOTE — TELEPHONE ENCOUNTER
----- Message from Danette Rojas MD sent at 8/23/2024 12:47 PM EDT -----  Please let the patient know that her gonorrhea/chlamydia results were normal. She still needs to complete her pelvic ultrasound.     Thank you!  Danette  ----- Message -----  From: Danette Rojas MD  Sent: 8/15/2024   1:05 PM EDT  To: Danette Rojas MD

## 2024-08-30 ENCOUNTER — APPOINTMENT (OUTPATIENT)
Dept: LAB | Facility: HOSPITAL | Age: 39
End: 2024-08-30
Payer: COMMERCIAL

## 2024-08-30 DIAGNOSIS — Z01.812 PRE-OPERATIVE LABORATORY EXAMINATION: ICD-10-CM

## 2024-08-30 LAB
APTT PPP: 25 SECONDS (ref 23–34)
BASOPHILS # BLD AUTO: 0.07 THOUSANDS/ÂΜL (ref 0–0.1)
BASOPHILS NFR BLD AUTO: 1 % (ref 0–1)
EOSINOPHIL # BLD AUTO: 0.1 THOUSAND/ÂΜL (ref 0–0.61)
EOSINOPHIL NFR BLD AUTO: 1 % (ref 0–6)
ERYTHROCYTE [DISTWIDTH] IN BLOOD BY AUTOMATED COUNT: 11.8 % (ref 11.6–15.1)
HCT VFR BLD AUTO: 41.7 % (ref 34.8–46.1)
HGB BLD-MCNC: 14.7 G/DL (ref 11.5–15.4)
IMM GRANULOCYTES # BLD AUTO: 0.02 THOUSAND/UL (ref 0–0.2)
IMM GRANULOCYTES NFR BLD AUTO: 0 % (ref 0–2)
INR PPP: 1.03 (ref 0.85–1.19)
LYMPHOCYTES # BLD AUTO: 2.6 THOUSANDS/ÂΜL (ref 0.6–4.47)
LYMPHOCYTES NFR BLD AUTO: 34 % (ref 14–44)
MCH RBC QN AUTO: 31.7 PG (ref 26.8–34.3)
MCHC RBC AUTO-ENTMCNC: 35.3 G/DL (ref 31.4–37.4)
MCV RBC AUTO: 90 FL (ref 82–98)
MONOCYTES # BLD AUTO: 0.54 THOUSAND/ÂΜL (ref 0.17–1.22)
MONOCYTES NFR BLD AUTO: 7 % (ref 4–12)
NEUTROPHILS # BLD AUTO: 4.33 THOUSANDS/ÂΜL (ref 1.85–7.62)
NEUTS SEG NFR BLD AUTO: 57 % (ref 43–75)
NRBC BLD AUTO-RTO: 0 /100 WBCS
PLATELET # BLD AUTO: 314 THOUSANDS/UL (ref 149–390)
PMV BLD AUTO: 10.2 FL (ref 8.9–12.7)
PROTHROMBIN TIME: 13.8 SECONDS (ref 12.3–15)
RBC # BLD AUTO: 4.63 MILLION/UL (ref 3.81–5.12)
WBC # BLD AUTO: 7.66 THOUSAND/UL (ref 4.31–10.16)

## 2024-08-30 PROCEDURE — 85025 COMPLETE CBC W/AUTO DIFF WBC: CPT

## 2024-08-30 PROCEDURE — 85610 PROTHROMBIN TIME: CPT

## 2024-08-30 PROCEDURE — 36415 COLL VENOUS BLD VENIPUNCTURE: CPT

## 2024-08-30 PROCEDURE — 85730 THROMBOPLASTIN TIME PARTIAL: CPT

## 2024-09-03 ENCOUNTER — ANESTHESIA EVENT (OUTPATIENT)
Dept: PERIOP | Facility: HOSPITAL | Age: 39
End: 2024-09-03
Payer: COMMERCIAL

## 2024-09-03 ENCOUNTER — TELEPHONE (OUTPATIENT)
Dept: OBGYN CLINIC | Facility: CLINIC | Age: 39
End: 2024-09-03

## 2024-09-03 NOTE — H&P
H&P Exam - Ene Samuels 39 y.o. female MRN: [id number]    Unit/Bed#:  Encounter: [id number]    Assessment:  Bilateral macromastia    Plan:  Bilateral breast reduction    History of Present Illness   This is a 39-year-old female with bilateral large heavy pendulous breast that hang down over her abdomen not supported well on the chest wall.  There is a chronic intertriginous rash in the inframammary fold    Review of Systems   All other systems reviewed and are negative.      Historical Information   No past medical history on file.  Past Surgical History:   Procedure Laterality Date     SECTION      2    TUBAL LIGATION       Social History   Social History     Substance and Sexual Activity   Alcohol Use Not Currently     Social History     Substance and Sexual Activity   Drug Use Never     Social History     Tobacco Use   Smoking Status Never   Smokeless Tobacco Never     E-Cigarette Use: Never User     E-Cigarette/Vaping Substances    Nicotine No     THC No     CBD No     Flavoring No     Other No     Unknown No        Family History: non-contributory    Meds/Allergies   all medications and allergies reviewed  No Known Allergies    Objective   First Vitals:        Current Vitals:        No intake or output data in the 24 hours ending 24 1528    Invasive Devices       None                   Physical Exam examination of the head ears eyes nose and throat is within normal limits heart sounds S1-S2 heard no murmurs or gallops lungs clear abdomen soft extremities are within normal limits bilateral breast are large heavy pendulous no masses felt    Lab Results:   Imaging:   EKG, Pathology, and Other Studies:     Code Status: No Order  Advance Directive and Living Will:      Power of :    POLST:      Counseling / Coordination of Care:   None

## 2024-09-03 NOTE — TELEPHONE ENCOUNTER
Called to discussed with patient normal US results. At this time, Ene reports her pain has improved. Discussed that pain may be GI related or scar tissue related from previous surgery. Will schedule additional appointment with office if pain recurs/worsens.     Danette Rojas MD  Obstetrics & Gynecology PGY-4  9/3/2024  9:14 AM  .

## 2024-09-04 ENCOUNTER — ANESTHESIA (OUTPATIENT)
Dept: PERIOP | Facility: HOSPITAL | Age: 39
End: 2024-09-04
Payer: COMMERCIAL

## 2024-09-04 ENCOUNTER — HOSPITAL ENCOUNTER (OUTPATIENT)
Facility: HOSPITAL | Age: 39
Setting detail: OUTPATIENT SURGERY
Discharge: HOME | End: 2024-09-04
Attending: PLASTIC SURGERY | Admitting: PLASTIC SURGERY
Payer: COMMERCIAL

## 2024-09-04 VITALS
RESPIRATION RATE: 16 BRPM | TEMPERATURE: 97.2 F | DIASTOLIC BLOOD PRESSURE: 73 MMHG | SYSTOLIC BLOOD PRESSURE: 129 MMHG | HEART RATE: 76 BPM | OXYGEN SATURATION: 99 %

## 2024-09-04 DIAGNOSIS — N62 HYPERTROPHY OF BREAST: Primary | ICD-10-CM

## 2024-09-04 PROCEDURE — 88305 TISSUE EXAM BY PATHOLOGIST: CPT | Performed by: STUDENT IN AN ORGANIZED HEALTH CARE EDUCATION/TRAINING PROGRAM

## 2024-09-04 RX ORDER — ACETAMINOPHEN 10 MG/ML
1000 INJECTION, SOLUTION INTRAVENOUS EVERY 6 HOURS PRN
Status: DISCONTINUED | OUTPATIENT
Start: 2024-09-04 | End: 2024-09-04 | Stop reason: HOSPADM

## 2024-09-04 RX ORDER — PROPOFOL 10 MG/ML
INJECTION, EMULSION INTRAVENOUS CONTINUOUS PRN
Status: DISCONTINUED | OUTPATIENT
Start: 2024-09-04 | End: 2024-09-04

## 2024-09-04 RX ORDER — TRAMADOL HYDROCHLORIDE 50 MG/1
50 TABLET ORAL EVERY 6 HOURS PRN
Status: DISCONTINUED | OUTPATIENT
Start: 2024-09-04 | End: 2024-09-04 | Stop reason: HOSPADM

## 2024-09-04 RX ORDER — ONDANSETRON 2 MG/ML
INJECTION INTRAMUSCULAR; INTRAVENOUS AS NEEDED
Status: DISCONTINUED | OUTPATIENT
Start: 2024-09-04 | End: 2024-09-04

## 2024-09-04 RX ORDER — ONDANSETRON 2 MG/ML
4 INJECTION INTRAMUSCULAR; INTRAVENOUS ONCE AS NEEDED
Status: CANCELLED | OUTPATIENT
Start: 2024-09-04

## 2024-09-04 RX ORDER — GLYCOPYRROLATE 0.2 MG/ML
INJECTION INTRAMUSCULAR; INTRAVENOUS AS NEEDED
Status: DISCONTINUED | OUTPATIENT
Start: 2024-09-04 | End: 2024-09-04

## 2024-09-04 RX ORDER — ROCURONIUM BROMIDE 10 MG/ML
INJECTION, SOLUTION INTRAVENOUS AS NEEDED
Status: DISCONTINUED | OUTPATIENT
Start: 2024-09-04 | End: 2024-09-04

## 2024-09-04 RX ORDER — DEXAMETHASONE SODIUM PHOSPHATE 10 MG/ML
INJECTION, SOLUTION INTRAMUSCULAR; INTRAVENOUS AS NEEDED
Status: DISCONTINUED | OUTPATIENT
Start: 2024-09-04 | End: 2024-09-04

## 2024-09-04 RX ORDER — CEPHALEXIN 500 MG/1
500 CAPSULE ORAL EVERY 8 HOURS SCHEDULED
Qty: 21 CAPSULE | Refills: 0 | Status: SHIPPED | OUTPATIENT
Start: 2024-09-04 | End: 2024-09-11

## 2024-09-04 RX ORDER — TRAMADOL HYDROCHLORIDE 50 MG/1
50 TABLET ORAL EVERY 6 HOURS PRN
Qty: 30 TABLET | Refills: 0 | Status: SHIPPED | OUTPATIENT
Start: 2024-09-04 | End: 2024-09-14

## 2024-09-04 RX ORDER — SODIUM CHLORIDE, SODIUM LACTATE, POTASSIUM CHLORIDE, CALCIUM CHLORIDE 600; 310; 30; 20 MG/100ML; MG/100ML; MG/100ML; MG/100ML
INJECTION, SOLUTION INTRAVENOUS CONTINUOUS PRN
Status: DISCONTINUED | OUTPATIENT
Start: 2024-09-04 | End: 2024-09-04

## 2024-09-04 RX ORDER — NEOSTIGMINE METHYLSULFATE 1 MG/ML
INJECTION INTRAVENOUS AS NEEDED
Status: DISCONTINUED | OUTPATIENT
Start: 2024-09-04 | End: 2024-09-04

## 2024-09-04 RX ORDER — LIDOCAINE HYDROCHLORIDE AND EPINEPHRINE 10; 10 MG/ML; UG/ML
INJECTION, SOLUTION INFILTRATION; PERINEURAL AS NEEDED
Status: DISCONTINUED | OUTPATIENT
Start: 2024-09-04 | End: 2024-09-04 | Stop reason: HOSPADM

## 2024-09-04 RX ORDER — FENTANYL CITRATE 50 UG/ML
INJECTION, SOLUTION INTRAMUSCULAR; INTRAVENOUS AS NEEDED
Status: DISCONTINUED | OUTPATIENT
Start: 2024-09-04 | End: 2024-09-04

## 2024-09-04 RX ORDER — SCOLOPAMINE TRANSDERMAL SYSTEM 1 MG/1
1 PATCH, EXTENDED RELEASE TRANSDERMAL
Status: DISCONTINUED | OUTPATIENT
Start: 2024-09-04 | End: 2024-09-04 | Stop reason: HOSPADM

## 2024-09-04 RX ORDER — CEFAZOLIN SODIUM 2 G/50ML
2000 SOLUTION INTRAVENOUS ONCE
Status: COMPLETED | OUTPATIENT
Start: 2024-09-04 | End: 2024-09-04

## 2024-09-04 RX ORDER — HYDROMORPHONE HCL/PF 1 MG/ML
SYRINGE (ML) INJECTION AS NEEDED
Status: DISCONTINUED | OUTPATIENT
Start: 2024-09-04 | End: 2024-09-04

## 2024-09-04 RX ORDER — MIDAZOLAM HYDROCHLORIDE 2 MG/2ML
INJECTION, SOLUTION INTRAMUSCULAR; INTRAVENOUS AS NEEDED
Status: DISCONTINUED | OUTPATIENT
Start: 2024-09-04 | End: 2024-09-04

## 2024-09-04 RX ORDER — CLONAZEPAM 0.5 MG/1
0.5 TABLET ORAL 2 TIMES DAILY
COMMUNITY

## 2024-09-04 RX ORDER — LIDOCAINE HYDROCHLORIDE 10 MG/ML
INJECTION, SOLUTION EPIDURAL; INFILTRATION; INTRACAUDAL; PERINEURAL AS NEEDED
Status: DISCONTINUED | OUTPATIENT
Start: 2024-09-04 | End: 2024-09-04

## 2024-09-04 RX ORDER — HYDROMORPHONE HCL/PF 1 MG/ML
0.5 SYRINGE (ML) INJECTION
Status: CANCELLED | OUTPATIENT
Start: 2024-09-04

## 2024-09-04 RX ORDER — FENTANYL CITRATE/PF 50 MCG/ML
25 SYRINGE (ML) INJECTION
Status: CANCELLED | OUTPATIENT
Start: 2024-09-04

## 2024-09-04 RX ORDER — PROPOFOL 10 MG/ML
INJECTION, EMULSION INTRAVENOUS AS NEEDED
Status: DISCONTINUED | OUTPATIENT
Start: 2024-09-04 | End: 2024-09-04

## 2024-09-04 RX ADMIN — PROPOFOL 100 MCG/KG/MIN: 10 INJECTION, EMULSION INTRAVENOUS at 09:32

## 2024-09-04 RX ADMIN — TRAMADOL HYDROCHLORIDE 50 MG: 50 TABLET, COATED ORAL at 15:32

## 2024-09-04 RX ADMIN — DEXAMETHASONE SODIUM PHOSPHATE 10 MG: 10 INJECTION, SOLUTION INTRAMUSCULAR; INTRAVENOUS at 09:44

## 2024-09-04 RX ADMIN — CEFAZOLIN SODIUM 2000 MG: 2 SOLUTION INTRAVENOUS at 09:27

## 2024-09-04 RX ADMIN — ONDANSETRON 4 MG: 2 INJECTION INTRAMUSCULAR; INTRAVENOUS at 12:11

## 2024-09-04 RX ADMIN — FENTANYL CITRATE 100 MCG: 50 INJECTION, SOLUTION INTRAMUSCULAR; INTRAVENOUS at 09:31

## 2024-09-04 RX ADMIN — NEOSTIGMINE METHYLSULFATE 3 MG: 1 INJECTION INTRAVENOUS at 13:07

## 2024-09-04 RX ADMIN — SCOPALAMINE 1 PATCH: 1 PATCH, EXTENDED RELEASE TRANSDERMAL at 07:55

## 2024-09-04 RX ADMIN — MIDAZOLAM 2 MG: 1 INJECTION INTRAMUSCULAR; INTRAVENOUS at 09:27

## 2024-09-04 RX ADMIN — ROCURONIUM BROMIDE 50 MG: 10 INJECTION INTRAVENOUS at 09:32

## 2024-09-04 RX ADMIN — SODIUM CHLORIDE, SODIUM LACTATE, POTASSIUM CHLORIDE, AND CALCIUM CHLORIDE: .6; .31; .03; .02 INJECTION, SOLUTION INTRAVENOUS at 09:27

## 2024-09-04 RX ADMIN — PROPOFOL 200 MG: 10 INJECTION, EMULSION INTRAVENOUS at 09:31

## 2024-09-04 RX ADMIN — ROCURONIUM BROMIDE 20 MG: 10 INJECTION INTRAVENOUS at 10:40

## 2024-09-04 RX ADMIN — LIDOCAINE HYDROCHLORIDE 50 MG: 10 INJECTION, SOLUTION EPIDURAL; INFILTRATION; INTRACAUDAL; PERINEURAL at 09:31

## 2024-09-04 RX ADMIN — ACETAMINOPHEN 1000 MG: 10 INJECTION INTRAVENOUS at 13:10

## 2024-09-04 RX ADMIN — GLYCOPYRROLATE 0.4 MG: 0.2 INJECTION INTRAMUSCULAR; INTRAVENOUS at 13:07

## 2024-09-04 RX ADMIN — REMIFENTANIL HYDROCHLORIDE 0.15 MCG/KG/MIN: 1 INJECTION, POWDER, LYOPHILIZED, FOR SOLUTION INTRAVENOUS at 09:32

## 2024-09-04 RX ADMIN — HYDROMORPHONE HYDROCHLORIDE 0.5 MG: 1 INJECTION, SOLUTION INTRAMUSCULAR; INTRAVENOUS; SUBCUTANEOUS at 11:56

## 2024-09-04 RX ADMIN — HYDROMORPHONE HYDROCHLORIDE 0.5 MG: 1 INJECTION, SOLUTION INTRAMUSCULAR; INTRAVENOUS; SUBCUTANEOUS at 10:43

## 2024-09-04 NOTE — DISCHARGE INSTR - AVS FIRST PAGE
THIS IS CONSIDERED MAJOR SURGERY. PLEASE ACT ACCORDINGLY. THE FOLLOWING INSTRUCTIONS ARE INTENDED AS A GUIDE FOR YOU TO FOLLOW AT HOME. THEY ARE NOT INTENDED AS A SUBSTITUTE FOR MEDICAL CARE.    AFTER SURGERY:    Following surgery before you're discharged from the short procedure unit the nurse will instruct you on how to activate your drains.  If you have EXCESSIVE DRAINAGE, meaning that the BULBS fill up twice in 1 hour, please call Dr. Mendes for further instructions.  Your drains will be removed the day after surgery in Dr. Mendes's office.    For your protection, we recommend that someone spend the first night with you because of medications prescribed and for assistance getting out of bed.  Leave your dressings in place; Dr. Mendes will change them the following day when drains are removed.  After your first visit, no dressings are necessary.  You may shower after your drains are removed; wash gently with soap and water and pat dry. Put on a nicole shirt/tank top then your bra on top of the shirt. NOTE: Shower only; do note use hot tub or baths until instructed. Do not use deodorant  until after the sutures are removed. I  Fill your prescriptions and taking medications as directed.  A very deep, sore muscular pain it is associated with this type of surgery and this is normal.  Pain medication will decrease the amount of pain that you have, but will not totally take it away, this is normal.  Do not drive while taking pain medication.  If you're more comfortable lying on your side this is permissible; however, do not sleep on your stomach for 2 weeks following surgery.  Any signs of bleeding or rash should be reported to the office.  If one breast becomes considerably larger was harder than the other, please call the office immediately.    FOLLOW-UP CARE:    It is recommended that you do not wear a bra for at least 3 weeks after surgery to allow the implants to drop down into the pocket.  Stitches will be  removed on the 10th postoperative day.  Never use a Heating Pad or Microwave Beads!  After stitches are removed it is important to use Scar Cream for approximately 3 months after surgery.      IF YOU HAVE ANY PROBLEMS OR QUESTIONS, PLEASE DO NOT HESITATE TO CALL THE OFFICE.    [phone]      Your first postoperative appointment will be tomorrow

## 2024-09-04 NOTE — ANESTHESIA POSTPROCEDURE EVALUATION
Post-Op Assessment Note    CV Status:  Stable  Pain Score: 0    Pain management: adequate       Mental Status:  Alert and awake   Hydration Status:  Euvolemic   PONV Controlled:  Controlled   Airway Patency:  Patent     Post Op Vitals Reviewed: Yes    No anethesia notable event occurred.    Staff: Anesthesiologist, CRNA           /59 (09/04/24 1332)    Temp (!) 97 °F (36.1 °C) (09/04/24 1332)    Pulse 70 (09/04/24 1332)   Resp 20 (09/04/24 1332)    SpO2 99 % (09/04/24 1332)

## 2024-09-04 NOTE — NURSING NOTE
Very sleepy upon return to [facility]. Arouseable by calling her name, but she drifts back to sleep. Pain 8/10, but unable to medicate due to decreased BP and being very sleepy.

## 2024-09-04 NOTE — NURSING NOTE
Ambulated to the bathroom with supervision of staff member. Voided. No distress. Drain care completed with patient and her mom. Drain record sent home with her. Instructed to take it to the office tomorrow when she goes to have ANDRA drains removed.

## 2024-09-04 NOTE — OP NOTE
OPERATIVE REPORT  PATIENT NAME: Ene Samuels    :  1985  MRN: [id number]  Pt Location:  OR ROOM 07    SURGERY DATE: 2024    Surgeons and Role:     * Satya Mendes MD - Primary    Preop Diagnosis:  Hypertrophy of breast [N62]    Post-Op Diagnosis Codes:     * Hypertrophy of breast [N62]    Procedure(s):  Bilateral - BREAST REDUCTION    Specimen(s):  ID Type Source Tests Collected by Time Destination   1 :  Tissue Breast, Left TISSUE EXAM Satya Mendes MD 2024 1016    2 :  Tissue Breast, Right TISSUE EXAM Satya Mendes MD 2024 1016        Estimated Blood Loss:   300 mL    Drains:  Closed/Suction Drain Inferior;Lateral;Right Breast Bulb 19 Fr. (Active)   Number of days: 0       Closed/Suction Drain Inferior;Lateral;Left Breast Bulb 19 Fr. (Active)   Number of days: 0       Anesthesia Type:   General    Operative Indications:  Hypertrophy of breast [N62]  Chronic intertriginous rash in the inframammary fold upper back neck strain notching in her shoulders    Operative Findings:  Excess skin and breast tissue bilateral      Complications:   None    Procedure and Technique:  The patient was marked in the holding area for an inferior pedicle keyhole pattern reduction mammoplasty draped and prepped in normal sterile fashion after general endotracheal anesthesia.  The lateral portions of the breast were contoured with tumescent liposuction breast was injected with local anesthesia the premarked area a 7 cm wide pedicle was de-epithelialized and the excess medial middle and lateral breast tissue was removed hemostasis was achieved with electrocautery the wounds were irrigated with normal saline on #19 channel drain was placed through a separate stab wound in the flank the dermis was then approximated with 2-0 Vicryl suture T incision closed with a running subcuticular 3-0 Prolene suture and nipple closed with a running subcuticular 4-0 Vicryl suture and identical procedure was  performed on both sides   I was present for the entire procedure.    Patient Disposition:  PACU              SIGNATURE: Satya Mendes MD  DATE: September 4, 2024  TIME: 1:01 PM

## 2024-09-04 NOTE — NURSING NOTE
Slept for about 1 hour after pain medication given. Pain decreased to 4/10 after Tramadol given. Tolerating food/drinks. IV being removed at this time. Ready for discharge.

## 2024-09-04 NOTE — INTERVAL H&P NOTE
H&P reviewed. After examining the patient I find no changes in the patients condition since the H&P had been written.    Vitals:    09/04/24 0743   BP: 128/72   Pulse: 81   Resp: 16   Temp: (!) 97 °F (36.1 °C)   SpO2: 99%

## 2024-09-04 NOTE — NURSING NOTE
Awake and alert at 1530. Tolerating toast/crackers and drinks. Pain 7/10 bilateral chest incisions. Medicated at 1532 with Tramadol. IV fluids continue. Mom at bedside.

## 2024-09-04 NOTE — ANESTHESIA PREPROCEDURE EVALUATION
Procedure:  BREAST REDUCTION (Bilateral: Breast)    Relevant Problems   NEURO/PSYCH   (+) Paresthesia        Physical Exam    Airway    Mallampati score: II  TM Distance: >3 FB  Neck ROM: full     Dental   No notable dental hx     Cardiovascular  Rhythm: regular, Rate: normal, Cardiovascular exam normal    Pulmonary  Pulmonary exam normal Breath sounds clear to auscultation    Other Findings  post-pubertal.      Anesthesia Plan  ASA Score- 1     Anesthesia Type- general with ASA Monitors.         Additional Monitors:     Airway Plan: ETT.           Plan Factors-Exercise tolerance (METS): >4 METS.    Chart reviewed. EKG reviewed. Imaging results reviewed. Existing labs reviewed. Patient summary reviewed.    Patient is not a current smoker.  Patient did not smoke on day of surgery.            Induction- intravenous.    Postoperative Plan- Plan for postoperative opioid use. Planned trial extubation    Perioperative Resuscitation Plan - Level 1 - Full Code.       Informed Consent- Anesthetic plan and risks discussed with patient.  I personally reviewed this patient with the CRNA. Discussed and agreed on the Anesthesia Plan with the CRNA..

## 2024-09-12 PROCEDURE — 88305 TISSUE EXAM BY PATHOLOGIST: CPT | Performed by: STUDENT IN AN ORGANIZED HEALTH CARE EDUCATION/TRAINING PROGRAM

## (undated) DEVICE — SYRINGE 10ML LL CONTROL TOP

## (undated) DEVICE — KERLIX BANDAGE ROLL: Brand: KERLIX

## (undated) DEVICE — OCCLUSIVE GAUZE STRIP,3% BISMUTH TRIBROMOPHENATE IN PETROLATUM BLEND: Brand: XEROFORM

## (undated) DEVICE — SCD SEQUENTIAL COMPRESSION COMFORT SLEEVE MEDIUM KNEE LENGTH: Brand: KENDALL SCD

## (undated) DEVICE — JP CHAN DRN SIL RND 19FR FULL W/TRO: Brand: JACKSON-PRATT

## (undated) DEVICE — NEEDLE 25G X 1 1/2

## (undated) DEVICE — SUT VICRYL 2-0 J459H

## (undated) DEVICE — JACKSON-PRATT 100CC BULB RESERVOIR: Brand: CARDINAL HEALTH

## (undated) DEVICE — BRA SURGICAL SZ 2XL (42-45)

## (undated) DEVICE — SINGLE PORT MANIFOLD: Brand: NEPTUNE 2

## (undated) DEVICE — SUT VICRYL 4-0 PS-2 18 IN J496G

## (undated) DEVICE — NEEDLE BLUNT 18 G X 1 1/2IN

## (undated) DEVICE — PACK UNIVERSAL DRAPES SUB-Q ICD

## (undated) DEVICE — SPONGE LAP 18 X 18 IN STRL RFD

## (undated) DEVICE — CANNULA 4MM TRI-PORT III 22CM 10 MM PORT

## (undated) DEVICE — BETHLEHEM UNIVERSAL BREAST PK: Brand: CARDINAL HEALTH

## (undated) DEVICE — DISPOSABLE OR TOWEL: Brand: CARDINAL HEALTH

## (undated) DEVICE — SKIN MARKER DUAL TIP WITH RULER CAP, FLEXIBLE RULER AND LABELS: Brand: DEVON

## (undated) DEVICE — WET SKIN PREP TRAY: Brand: MEDLINE INDUSTRIES, INC.

## (undated) DEVICE — STERILE POLYISOPRENE POWDER-FREE SURGICAL GLOVES: Brand: PROTEXIS

## (undated) DEVICE — NEPTUNE E-SEP SMOKE EVACUATION PENCIL, COATED, 70MM BLADE, PUSH BUTTON SWITCH: Brand: NEPTUNE E-SEP

## (undated) DEVICE — SPONGE COUNTER BAG BLUE

## (undated) DEVICE — DRAPE EQUIPMENT RF WAND

## (undated) DEVICE — DRAPE SHEET THREE QUARTER

## (undated) DEVICE — SYRINGE 30ML LL

## (undated) DEVICE — INTENDED FOR TISSUE SEPARATION, AND OTHER PROCEDURES THAT REQUIRE A SHARP SURGICAL BLADE TO PUNCTURE OR CUT.: Brand: BARD-PARKER ® SAFETYLOCK CARBON RIB-BACK BLADES

## (undated) DEVICE — SUT PROLENE 3-0 PC-5 18 IN 8632G